# Patient Record
Sex: FEMALE | Race: WHITE | Employment: FULL TIME | URBAN - METROPOLITAN AREA
[De-identification: names, ages, dates, MRNs, and addresses within clinical notes are randomized per-mention and may not be internally consistent; named-entity substitution may affect disease eponyms.]

---

## 2017-01-18 ENCOUNTER — OFFICE VISIT (OUTPATIENT)
Dept: ORTHOPEDIC SURGERY | Age: 52
End: 2017-01-18

## 2017-01-18 VITALS
SYSTOLIC BLOOD PRESSURE: 145 MMHG | HEART RATE: 77 BPM | DIASTOLIC BLOOD PRESSURE: 91 MMHG | WEIGHT: 220.02 LBS | HEIGHT: 65 IN | BODY MASS INDEX: 36.66 KG/M2

## 2017-01-18 DIAGNOSIS — M76.61 ACHILLES TENDINITIS OF RIGHT LOWER EXTREMITY: ICD-10-CM

## 2017-01-18 DIAGNOSIS — M25.571 ACUTE RIGHT ANKLE PAIN: Primary | ICD-10-CM

## 2017-01-18 PROCEDURE — 99213 OFFICE O/P EST LOW 20 MIN: CPT | Performed by: ORTHOPAEDIC SURGERY

## 2017-01-25 ENCOUNTER — OFFICE VISIT (OUTPATIENT)
Dept: ORTHOPEDIC SURGERY | Age: 52
End: 2017-01-25

## 2017-01-25 VITALS
WEIGHT: 240 LBS | BODY MASS INDEX: 39.99 KG/M2 | HEIGHT: 65 IN | HEART RATE: 73 BPM | DIASTOLIC BLOOD PRESSURE: 82 MMHG | SYSTOLIC BLOOD PRESSURE: 126 MMHG

## 2017-01-25 DIAGNOSIS — S86.011A PARTIAL TEAR OF ACHILLES TENDON, RIGHT, INITIAL ENCOUNTER: ICD-10-CM

## 2017-01-25 DIAGNOSIS — M76.61 ACHILLES TENDINITIS OF RIGHT LOWER EXTREMITY: Primary | ICD-10-CM

## 2017-01-25 PROBLEM — S86.019A PARTIAL TEAR OF ACHILLES TENDON: Status: ACTIVE | Noted: 2017-01-25

## 2017-01-25 PROCEDURE — 99213 OFFICE O/P EST LOW 20 MIN: CPT | Performed by: ORTHOPAEDIC SURGERY

## 2017-01-31 ENCOUNTER — OFFICE VISIT (OUTPATIENT)
Dept: ORTHOPEDIC SURGERY | Age: 52
End: 2017-01-31

## 2017-01-31 VITALS
DIASTOLIC BLOOD PRESSURE: 82 MMHG | SYSTOLIC BLOOD PRESSURE: 131 MMHG | HEART RATE: 71 BPM | HEIGHT: 65 IN | BODY MASS INDEX: 40 KG/M2 | WEIGHT: 240.08 LBS

## 2017-01-31 DIAGNOSIS — M76.61 ACHILLES TENDINITIS OF RIGHT LOWER EXTREMITY: Primary | ICD-10-CM

## 2017-01-31 PROCEDURE — 99214 OFFICE O/P EST MOD 30 MIN: CPT | Performed by: ORTHOPAEDIC SURGERY

## 2017-01-31 RX ORDER — HYDROCODONE BITARTRATE AND ACETAMINOPHEN 5; 325 MG/1; MG/1
1 TABLET ORAL EVERY 6 HOURS PRN
Qty: 40 TABLET | Refills: 0 | Status: SHIPPED | OUTPATIENT
Start: 2017-01-31 | End: 2017-02-07

## 2017-01-31 RX ORDER — CLINDAMYCIN HYDROCHLORIDE 300 MG/1
300 CAPSULE ORAL EVERY 8 HOURS
Qty: 6 CAPSULE | Refills: 0 | Status: SHIPPED | OUTPATIENT
Start: 2017-01-31 | End: 2017-02-13

## 2017-02-02 ENCOUNTER — TELEPHONE (OUTPATIENT)
Dept: ORTHOPEDIC SURGERY | Age: 52
End: 2017-02-02

## 2017-02-20 ENCOUNTER — HOSPITAL ENCOUNTER (OUTPATIENT)
Dept: SURGERY | Age: 52
Discharge: HOME | End: 2017-02-20
Attending: ORTHOPAEDIC SURGERY | Admitting: ORTHOPAEDIC SURGERY

## 2017-02-20 VITALS
HEIGHT: 65 IN | BODY MASS INDEX: 37.32 KG/M2 | OXYGEN SATURATION: 97 % | RESPIRATION RATE: 14 BRPM | TEMPERATURE: 97.7 F | SYSTOLIC BLOOD PRESSURE: 133 MMHG | HEART RATE: 67 BPM | DIASTOLIC BLOOD PRESSURE: 69 MMHG | WEIGHT: 224 LBS

## 2017-02-20 LAB
EKG ATRIAL RATE: 59 BPM
EKG DIAGNOSIS: NORMAL
EKG P AXIS: 40 DEGREES
EKG P-R INTERVAL: 162 MS
EKG Q-T INTERVAL: 488 MS
EKG QRS DURATION: 80 MS
EKG QTC CALCULATION (BAZETT): 483 MS
EKG R AXIS: 9 DEGREES
EKG T AXIS: 9 DEGREES
EKG VENTRICULAR RATE: 59 BPM

## 2017-02-20 PROCEDURE — 93010 ELECTROCARDIOGRAM REPORT: CPT | Performed by: INTERNAL MEDICINE

## 2017-02-20 RX ORDER — TRAZODONE HYDROCHLORIDE 50 MG/1
50 TABLET ORAL NIGHTLY
COMMUNITY

## 2017-02-20 RX ORDER — LIDOCAINE HYDROCHLORIDE 10 MG/ML
2 INJECTION, SOLUTION INFILTRATION; PERINEURAL
Status: ACTIVE | OUTPATIENT
Start: 2017-02-20 | End: 2017-02-20

## 2017-02-20 RX ORDER — MEPERIDINE HYDROCHLORIDE 50 MG/ML
12.5 INJECTION INTRAMUSCULAR; INTRAVENOUS; SUBCUTANEOUS EVERY 5 MIN PRN
Status: DISCONTINUED | OUTPATIENT
Start: 2017-02-20 | End: 2017-02-21 | Stop reason: HOSPADM

## 2017-02-20 RX ORDER — OXYCODONE HYDROCHLORIDE AND ACETAMINOPHEN 5; 325 MG/1; MG/1
2 TABLET ORAL PRN
Status: COMPLETED | OUTPATIENT
Start: 2017-02-20 | End: 2017-02-20

## 2017-02-20 RX ORDER — LABETALOL HYDROCHLORIDE 5 MG/ML
5 INJECTION, SOLUTION INTRAVENOUS EVERY 10 MIN PRN
Status: DISCONTINUED | OUTPATIENT
Start: 2017-02-20 | End: 2017-02-21 | Stop reason: HOSPADM

## 2017-02-20 RX ORDER — SODIUM CHLORIDE, SODIUM LACTATE, POTASSIUM CHLORIDE, CALCIUM CHLORIDE 600; 310; 30; 20 MG/100ML; MG/100ML; MG/100ML; MG/100ML
INJECTION, SOLUTION INTRAVENOUS CONTINUOUS
Status: DISCONTINUED | OUTPATIENT
Start: 2017-02-20 | End: 2017-02-21 | Stop reason: HOSPADM

## 2017-02-20 RX ORDER — MIDAZOLAM HYDROCHLORIDE 1 MG/ML
INJECTION INTRAMUSCULAR; INTRAVENOUS
Status: DISPENSED
Start: 2017-02-20 | End: 2017-02-20

## 2017-02-20 RX ORDER — ONDANSETRON 2 MG/ML
4 INJECTION INTRAMUSCULAR; INTRAVENOUS EVERY 10 MIN PRN
Status: DISCONTINUED | OUTPATIENT
Start: 2017-02-20 | End: 2017-02-21 | Stop reason: HOSPADM

## 2017-02-20 RX ORDER — OXYCODONE HYDROCHLORIDE AND ACETAMINOPHEN 5; 325 MG/1; MG/1
1 TABLET ORAL PRN
Status: COMPLETED | OUTPATIENT
Start: 2017-02-20 | End: 2017-02-20

## 2017-02-20 RX ORDER — HYDRALAZINE HYDROCHLORIDE 20 MG/ML
5 INJECTION INTRAMUSCULAR; INTRAVENOUS EVERY 10 MIN PRN
Status: DISCONTINUED | OUTPATIENT
Start: 2017-02-20 | End: 2017-02-21 | Stop reason: HOSPADM

## 2017-02-20 RX ADMIN — Medication 0.5 MG: at 11:55

## 2017-02-20 RX ADMIN — OXYCODONE HYDROCHLORIDE AND ACETAMINOPHEN 1 TABLET: 5; 325 TABLET ORAL at 12:22

## 2017-02-20 RX ADMIN — SODIUM CHLORIDE, SODIUM LACTATE, POTASSIUM CHLORIDE, CALCIUM CHLORIDE: 600; 310; 30; 20 INJECTION, SOLUTION INTRAVENOUS at 09:08

## 2017-02-20 ASSESSMENT — PAIN SCALES - GENERAL
PAINLEVEL_OUTOF10: 10
PAINLEVEL_OUTOF10: 6
PAINLEVEL_OUTOF10: 10

## 2017-02-20 ASSESSMENT — PAIN - FUNCTIONAL ASSESSMENT: PAIN_FUNCTIONAL_ASSESSMENT: 0-10

## 2017-03-01 ENCOUNTER — OFFICE VISIT (OUTPATIENT)
Dept: ORTHOPEDIC SURGERY | Age: 52
End: 2017-03-01

## 2017-03-01 VITALS
DIASTOLIC BLOOD PRESSURE: 87 MMHG | SYSTOLIC BLOOD PRESSURE: 119 MMHG | WEIGHT: 223.99 LBS | HEART RATE: 114 BPM | HEIGHT: 65 IN | BODY MASS INDEX: 37.32 KG/M2

## 2017-03-01 DIAGNOSIS — S86.011D PARTIAL TEAR OF ACHILLES TENDON, RIGHT, SUBSEQUENT ENCOUNTER: Primary | ICD-10-CM

## 2017-03-01 PROCEDURE — 29405 APPL SHORT LEG CAST: CPT | Performed by: ORTHOPAEDIC SURGERY

## 2017-03-01 PROCEDURE — 99024 POSTOP FOLLOW-UP VISIT: CPT | Performed by: ORTHOPAEDIC SURGERY

## 2017-03-15 ENCOUNTER — OFFICE VISIT (OUTPATIENT)
Dept: ORTHOPEDIC SURGERY | Age: 52
End: 2017-03-15

## 2017-03-15 VITALS
BODY MASS INDEX: 37.32 KG/M2 | DIASTOLIC BLOOD PRESSURE: 78 MMHG | HEART RATE: 72 BPM | HEIGHT: 65 IN | WEIGHT: 223.99 LBS | SYSTOLIC BLOOD PRESSURE: 117 MMHG

## 2017-03-15 DIAGNOSIS — M76.61 ACHILLES TENDINITIS OF RIGHT LOWER EXTREMITY: Primary | ICD-10-CM

## 2017-03-15 PROCEDURE — 99024 POSTOP FOLLOW-UP VISIT: CPT | Performed by: ORTHOPAEDIC SURGERY

## 2017-04-05 ENCOUNTER — OFFICE VISIT (OUTPATIENT)
Dept: ORTHOPEDIC SURGERY | Age: 52
End: 2017-04-05

## 2017-04-05 VITALS
HEART RATE: 81 BPM | SYSTOLIC BLOOD PRESSURE: 126 MMHG | WEIGHT: 223.99 LBS | BODY MASS INDEX: 37.32 KG/M2 | HEIGHT: 65 IN | DIASTOLIC BLOOD PRESSURE: 82 MMHG

## 2017-04-05 DIAGNOSIS — M76.61 ACHILLES TENDINITIS OF RIGHT LOWER EXTREMITY: Primary | ICD-10-CM

## 2017-04-05 PROCEDURE — 99024 POSTOP FOLLOW-UP VISIT: CPT | Performed by: ORTHOPAEDIC SURGERY

## 2017-04-07 ENCOUNTER — HOSPITAL ENCOUNTER (OUTPATIENT)
Dept: PHYSICAL THERAPY | Age: 52
Discharge: HOME | End: 2017-04-30
Admitting: ORTHOPAEDIC SURGERY

## 2017-04-18 ENCOUNTER — HOSPITAL ENCOUNTER (OUTPATIENT)
Dept: PHYSICAL THERAPY | Age: 52
Discharge: HOME | End: 2017-04-18
Admitting: ORTHOPAEDIC SURGERY

## 2017-05-03 ENCOUNTER — OFFICE VISIT (OUTPATIENT)
Dept: ORTHOPEDIC SURGERY | Age: 52
End: 2017-05-03

## 2017-05-03 VITALS
DIASTOLIC BLOOD PRESSURE: 88 MMHG | BODY MASS INDEX: 37.32 KG/M2 | SYSTOLIC BLOOD PRESSURE: 136 MMHG | HEART RATE: 92 BPM | WEIGHT: 223.99 LBS | HEIGHT: 65 IN

## 2017-05-03 DIAGNOSIS — M76.61 ACHILLES TENDINITIS OF RIGHT LOWER EXTREMITY: Primary | ICD-10-CM

## 2017-05-03 PROCEDURE — 99024 POSTOP FOLLOW-UP VISIT: CPT | Performed by: ORTHOPAEDIC SURGERY

## 2017-05-15 ENCOUNTER — HOSPITAL ENCOUNTER (OUTPATIENT)
Dept: PHYSICAL THERAPY | Age: 52
Discharge: HOME | End: 2017-05-15
Admitting: ORTHOPAEDIC SURGERY

## 2017-05-30 ENCOUNTER — HOSPITAL ENCOUNTER (OUTPATIENT)
Dept: PHYSICAL THERAPY | Age: 52
Discharge: HOME | End: 2017-05-30
Admitting: ORTHOPAEDIC SURGERY

## 2017-06-14 ENCOUNTER — OFFICE VISIT (OUTPATIENT)
Dept: ORTHOPEDIC SURGERY | Age: 52
End: 2017-06-14

## 2017-06-14 VITALS
WEIGHT: 223.99 LBS | HEART RATE: 65 BPM | HEIGHT: 65 IN | BODY MASS INDEX: 37.32 KG/M2 | DIASTOLIC BLOOD PRESSURE: 76 MMHG | SYSTOLIC BLOOD PRESSURE: 115 MMHG

## 2017-06-14 DIAGNOSIS — M76.61 ACHILLES TENDINITIS OF RIGHT LOWER EXTREMITY: Primary | ICD-10-CM

## 2017-06-14 PROCEDURE — 99212 OFFICE O/P EST SF 10 MIN: CPT | Performed by: ORTHOPAEDIC SURGERY

## 2017-07-31 ENCOUNTER — OFFICE VISIT (OUTPATIENT)
Dept: ORTHOPEDIC SURGERY | Age: 52
End: 2017-07-31

## 2017-07-31 VITALS
WEIGHT: 223.99 LBS | HEART RATE: 67 BPM | BODY MASS INDEX: 37.32 KG/M2 | SYSTOLIC BLOOD PRESSURE: 121 MMHG | DIASTOLIC BLOOD PRESSURE: 82 MMHG | HEIGHT: 65 IN

## 2017-07-31 DIAGNOSIS — M50.30 DDD (DEGENERATIVE DISC DISEASE), CERVICAL: ICD-10-CM

## 2017-07-31 DIAGNOSIS — M54.12 CERVICAL RADICULITIS: ICD-10-CM

## 2017-07-31 DIAGNOSIS — M54.2 NECK PAIN: Primary | ICD-10-CM

## 2017-07-31 PROCEDURE — 99214 OFFICE O/P EST MOD 30 MIN: CPT | Performed by: PHYSICIAN ASSISTANT

## 2017-07-31 PROCEDURE — 72050 X-RAY EXAM NECK SPINE 4/5VWS: CPT | Performed by: PHYSICIAN ASSISTANT

## 2017-07-31 RX ORDER — GABAPENTIN 300 MG/1
CAPSULE ORAL
Qty: 30 CAPSULE | Refills: 0 | Status: SHIPPED | OUTPATIENT
Start: 2017-07-31

## 2017-07-31 RX ORDER — PREDNISONE 10 MG/1
TABLET ORAL
Qty: 26 TABLET | Refills: 0 | Status: SHIPPED | OUTPATIENT
Start: 2017-07-31 | End: 2019-07-29

## 2017-11-22 ENCOUNTER — HOSPITAL ENCOUNTER (OUTPATIENT)
Dept: OTHER | Age: 52
Discharge: HOME | End: 2017-11-22
Attending: NURSE PRACTITIONER | Admitting: NURSE PRACTITIONER

## 2017-11-22 LAB
EKG ATRIAL RATE: 72 BPM
EKG DIAGNOSIS: NORMAL
EKG P AXIS: 40 DEGREES
EKG P-R INTERVAL: 164 MS
EKG Q-T INTERVAL: 432 MS
EKG QRS DURATION: 76 MS
EKG QTC CALCULATION (BAZETT): 473 MS
EKG R AXIS: 18 DEGREES
EKG T AXIS: 13 DEGREES
EKG VENTRICULAR RATE: 72 BPM

## 2017-11-22 PROCEDURE — 93010 ELECTROCARDIOGRAM REPORT: CPT | Performed by: INTERNAL MEDICINE

## 2018-11-12 ENCOUNTER — OFFICE VISIT (OUTPATIENT)
Dept: ORTHOPEDIC SURGERY | Age: 53
End: 2018-11-12

## 2018-11-12 VITALS — HEIGHT: 65 IN | BODY MASS INDEX: 37.49 KG/M2 | RESPIRATION RATE: 16 BRPM | WEIGHT: 225 LBS

## 2018-11-12 DIAGNOSIS — M67.441 GANGLION CYST OF FLEXOR TENDON SHEATH OF FINGER OF RIGHT HAND: ICD-10-CM

## 2018-11-12 DIAGNOSIS — M79.644 FINGER PAIN, RIGHT: Primary | ICD-10-CM

## 2018-11-12 DIAGNOSIS — R22.31 FINGER MASS, RIGHT: ICD-10-CM

## 2018-11-12 PROCEDURE — 20550 NJX 1 TENDON SHEATH/LIGAMENT: CPT | Performed by: ORTHOPAEDIC SURGERY

## 2018-11-12 PROCEDURE — 99202 OFFICE O/P NEW SF 15 MIN: CPT | Performed by: ORTHOPAEDIC SURGERY

## 2018-11-12 NOTE — PROGRESS NOTES
were made to ensure that this office note is accurate.     Orders Placed This Encounter   Procedures    XR FINGER RIGHT (MIN 2 VIEWS)    TX INJECT TENDON SHEATH/LIGAMENT    TX BETAMETHASONE ACET&SOD PHOSP

## 2019-01-07 ENCOUNTER — OFFICE VISIT (OUTPATIENT)
Dept: ORTHOPEDIC SURGERY | Age: 54
End: 2019-01-07
Payer: COMMERCIAL

## 2019-01-07 VITALS
WEIGHT: 225.09 LBS | HEIGHT: 65 IN | HEART RATE: 84 BPM | SYSTOLIC BLOOD PRESSURE: 140 MMHG | DIASTOLIC BLOOD PRESSURE: 89 MMHG | BODY MASS INDEX: 37.5 KG/M2

## 2019-01-07 DIAGNOSIS — S86.112A GASTROCNEMIUS MUSCLE RUPTURE, LEFT, INITIAL ENCOUNTER: Primary | ICD-10-CM

## 2019-01-07 PROCEDURE — 99203 OFFICE O/P NEW LOW 30 MIN: CPT | Performed by: ORTHOPAEDIC SURGERY

## 2019-01-24 ENCOUNTER — OFFICE VISIT (OUTPATIENT)
Dept: ORTHOPEDIC SURGERY | Age: 54
End: 2019-01-24
Payer: COMMERCIAL

## 2019-01-24 VITALS — WEIGHT: 225.09 LBS | HEIGHT: 65 IN | BODY MASS INDEX: 37.5 KG/M2

## 2019-01-24 DIAGNOSIS — M76.62 ACHILLES TENDINITIS OF LEFT LOWER EXTREMITY: Primary | ICD-10-CM

## 2019-01-24 PROCEDURE — 99213 OFFICE O/P EST LOW 20 MIN: CPT | Performed by: ORTHOPAEDIC SURGERY

## 2019-01-24 RX ORDER — METHYLPREDNISOLONE 4 MG/1
TABLET ORAL
Qty: 1 KIT | Refills: 0 | Status: SHIPPED | OUTPATIENT
Start: 2019-01-24 | End: 2019-07-29

## 2019-02-04 ENCOUNTER — OFFICE VISIT (OUTPATIENT)
Dept: ORTHOPEDIC SURGERY | Age: 54
End: 2019-02-04
Payer: COMMERCIAL

## 2019-02-04 VITALS
WEIGHT: 225.09 LBS | SYSTOLIC BLOOD PRESSURE: 114 MMHG | HEART RATE: 82 BPM | BODY MASS INDEX: 37.5 KG/M2 | DIASTOLIC BLOOD PRESSURE: 69 MMHG | HEIGHT: 65 IN

## 2019-02-04 DIAGNOSIS — M76.62 ACHILLES BURSITIS OF LEFT LOWER EXTREMITY: Primary | ICD-10-CM

## 2019-02-04 PROCEDURE — 99213 OFFICE O/P EST LOW 20 MIN: CPT | Performed by: ORTHOPAEDIC SURGERY

## 2019-07-29 ENCOUNTER — HOSPITAL ENCOUNTER (EMERGENCY)
Age: 54
Discharge: HOME | End: 2019-07-29
Payer: COMMERCIAL

## 2019-07-29 VITALS
RESPIRATION RATE: 16 BRPM | TEMPERATURE: 98.2 F | HEART RATE: 82 BPM | HEIGHT: 65 IN | BODY MASS INDEX: 37.65 KG/M2 | OXYGEN SATURATION: 96 % | DIASTOLIC BLOOD PRESSURE: 77 MMHG | WEIGHT: 226 LBS | SYSTOLIC BLOOD PRESSURE: 130 MMHG

## 2019-07-29 DIAGNOSIS — M79.605 LEFT LEG PAIN: Primary | ICD-10-CM

## 2019-07-29 PROCEDURE — 6370000000 HC RX 637 (ALT 250 FOR IP): Performed by: PHYSICIAN ASSISTANT

## 2019-07-29 PROCEDURE — 99282 EMERGENCY DEPT VISIT SF MDM: CPT

## 2019-07-29 RX ADMIN — APIXABAN 10 MG: 5 TABLET, FILM COATED ORAL at 17:52

## 2019-07-29 ASSESSMENT — PAIN DESCRIPTION - PROGRESSION: CLINICAL_PROGRESSION: NOT CHANGED

## 2019-07-29 ASSESSMENT — PAIN DESCRIPTION - LOCATION: LOCATION: LEG

## 2019-07-29 ASSESSMENT — PAIN DESCRIPTION - DESCRIPTORS: DESCRIPTORS: ACHING;CONSTANT

## 2019-07-29 ASSESSMENT — PAIN DESCRIPTION - PAIN TYPE: TYPE: ACUTE PAIN

## 2019-07-29 ASSESSMENT — PAIN SCALES - GENERAL: PAINLEVEL_OUTOF10: 8

## 2019-07-29 ASSESSMENT — PAIN DESCRIPTION - ONSET: ONSET: ON-GOING

## 2019-07-29 ASSESSMENT — PAIN DESCRIPTION - FREQUENCY: FREQUENCY: CONTINUOUS

## 2019-07-29 ASSESSMENT — PAIN DESCRIPTION - ORIENTATION: ORIENTATION: LEFT

## 2019-07-30 ENCOUNTER — HOSPITAL ENCOUNTER (OUTPATIENT)
Dept: VASCULAR LAB | Age: 54
Discharge: HOME | End: 2019-07-30
Payer: COMMERCIAL

## 2019-07-30 DIAGNOSIS — M79.605 LEFT LEG PAIN: ICD-10-CM

## 2019-07-30 PROCEDURE — 93971 EXTREMITY STUDY: CPT

## 2020-10-05 ENCOUNTER — HOSPITAL ENCOUNTER (EMERGENCY)
Age: 55
Discharge: HOME | End: 2020-10-05
Attending: EMERGENCY MEDICINE
Payer: COMMERCIAL

## 2020-10-05 ENCOUNTER — HOSPITAL ENCOUNTER (OUTPATIENT)
Dept: VASCULAR LAB | Age: 55
Discharge: HOME | End: 2020-10-05
Payer: COMMERCIAL

## 2020-10-05 VITALS
TEMPERATURE: 98.3 F | OXYGEN SATURATION: 100 % | SYSTOLIC BLOOD PRESSURE: 159 MMHG | HEART RATE: 79 BPM | DIASTOLIC BLOOD PRESSURE: 94 MMHG | RESPIRATION RATE: 21 BRPM

## 2020-10-05 LAB
ANION GAP SERPL CALCULATED.3IONS-SCNC: 7 MMOL/L (ref 3–16)
APTT: 30.8 SEC (ref 24.2–36.2)
BASOPHILS ABSOLUTE: 0.1 K/UL (ref 0–0.2)
BASOPHILS RELATIVE PERCENT: 0.6 %
BUN BLDV-MCNC: 8 MG/DL (ref 7–20)
CALCIUM SERPL-MCNC: 9.2 MG/DL (ref 8.3–10.6)
CHLORIDE BLD-SCNC: 101 MMOL/L (ref 99–110)
CO2: 29 MMOL/L (ref 21–32)
CREAT SERPL-MCNC: 0.8 MG/DL (ref 0.6–1.1)
EOSINOPHILS ABSOLUTE: 0 K/UL (ref 0–0.6)
EOSINOPHILS RELATIVE PERCENT: 0 %
GFR AFRICAN AMERICAN: >60
GFR NON-AFRICAN AMERICAN: >60
GLUCOSE BLD-MCNC: 99 MG/DL (ref 70–99)
HCT VFR BLD CALC: 40.1 % (ref 36–48)
HEMOGLOBIN: 13.5 G/DL (ref 12–16)
INR BLD: 1.05 (ref 0.86–1.14)
LYMPHOCYTES ABSOLUTE: 2.4 K/UL (ref 1–5.1)
LYMPHOCYTES RELATIVE PERCENT: 28.7 %
MCH RBC QN AUTO: 30 PG (ref 26–34)
MCHC RBC AUTO-ENTMCNC: 33.7 G/DL (ref 31–36)
MCV RBC AUTO: 88.9 FL (ref 80–100)
MONOCYTES ABSOLUTE: 0.7 K/UL (ref 0–1.3)
MONOCYTES RELATIVE PERCENT: 8.2 %
NEUTROPHILS ABSOLUTE: 5.3 K/UL (ref 1.7–7.7)
NEUTROPHILS RELATIVE PERCENT: 62.5 %
PDW BLD-RTO: 14.3 % (ref 12.4–15.4)
PLATELET # BLD: 229 K/UL (ref 135–450)
PMV BLD AUTO: 8 FL (ref 5–10.5)
POTASSIUM REFLEX MAGNESIUM: 3.9 MMOL/L (ref 3.5–5.1)
PROTHROMBIN TIME: 12.2 SEC (ref 10–13.2)
RBC # BLD: 4.51 M/UL (ref 4–5.2)
SODIUM BLD-SCNC: 137 MMOL/L (ref 136–145)
WBC # BLD: 8.5 K/UL (ref 4–11)

## 2020-10-05 PROCEDURE — 93971 EXTREMITY STUDY: CPT

## 2020-10-05 PROCEDURE — 99283 EMERGENCY DEPT VISIT LOW MDM: CPT

## 2020-10-05 PROCEDURE — 85730 THROMBOPLASTIN TIME PARTIAL: CPT

## 2020-10-05 PROCEDURE — 85025 COMPLETE CBC W/AUTO DIFF WBC: CPT

## 2020-10-05 PROCEDURE — 85610 PROTHROMBIN TIME: CPT

## 2020-10-05 PROCEDURE — 6370000000 HC RX 637 (ALT 250 FOR IP): Performed by: EMERGENCY MEDICINE

## 2020-10-05 PROCEDURE — 80048 BASIC METABOLIC PNL TOTAL CA: CPT

## 2020-10-05 RX ADMIN — APIXABAN 80 MG: 5 TABLET, FILM COATED ORAL at 06:56

## 2020-10-05 RX ADMIN — APIXABAN 10 MG: 5 TABLET, FILM COATED ORAL at 06:56

## 2020-10-05 ASSESSMENT — PAIN DESCRIPTION - ORIENTATION: ORIENTATION: LEFT

## 2020-10-05 ASSESSMENT — PAIN DESCRIPTION - PAIN TYPE: TYPE: ACUTE PAIN

## 2020-10-05 ASSESSMENT — PAIN DESCRIPTION - LOCATION: LOCATION: LEG

## 2020-10-05 ASSESSMENT — PAIN DESCRIPTION - DESCRIPTORS: DESCRIPTORS: ACHING

## 2020-10-05 ASSESSMENT — PAIN SCALES - GENERAL: PAINLEVEL_OUTOF10: 4

## 2020-10-05 ASSESSMENT — PAIN DESCRIPTION - FREQUENCY: FREQUENCY: CONTINUOUS

## 2020-10-05 NOTE — ED PROVIDER NOTES
MG TABS TABLET    Take 10 mg (2 tablets) orally twice daily for 7 days, then take 5 mg (1 tablet) orally twice daily thereafter. BIOTIN 5000 PO    Take 1 tablet by mouth daily. GABAPENTIN (NEURONTIN) 300 MG CAPSULE    i po qHS    METOPROLOL (LOPRESSOR) 100 MG TABLET    Take 50 mg by mouth nightly     MULTIPLE VITAMINS-CALCIUM (ONE-A-DAY WOMENS PO)    Take 1 tablet by mouth daily. ROSUVASTATIN (CRESTOR) 40 MG TABLET    Take 40 mg by mouth every evening.       SERTRALINE (ZOLOFT) 100 MG TABLET    Take 200 mg by mouth daily     TRAZODONE (DESYREL) 50 MG TABLET    Take 50 mg by mouth nightly 1/2 as needed    VITAMIN D (CHOLECALCIFEROL) 1000 UNIT TABS TABLET    Take 2,000 Units by mouth daily        ALLERGIES     Amoxicillin; Codeine; Naproxen; Pravastatin; and Vancomycin    FAMILY HISTORY       Family History   Problem Relation Age of Onset    Heart Disease Father         CHF    Diabetes Father           SOCIAL HISTORY       Social History     Socioeconomic History    Marital status:      Spouse name: None    Number of children: None    Years of education: None    Highest education level: None   Occupational History    None   Social Needs    Financial resource strain: None    Food insecurity     Worry: None     Inability: None    Transportation needs     Medical: None     Non-medical: None   Tobacco Use    Smoking status: Never Smoker    Smokeless tobacco: Never Used   Substance and Sexual Activity    Alcohol use: No    Drug use: No    Sexual activity: Yes     Partners: Male   Lifestyle    Physical activity     Days per week: None     Minutes per session: None    Stress: None   Relationships    Social connections     Talks on phone: None     Gets together: None     Attends Scientologist service: None     Active member of club or organization: None     Attends meetings of clubs or organizations: None     Relationship status: None    Intimate partner violence     Fear of current or ex partner: None     Emotionally abused: None     Physically abused: None     Forced sexual activity: None   Other Topics Concern    None   Social History Narrative    None       SCREENINGS    Jorje Coma Scale  Eye Opening: Spontaneous  Best Verbal Response: Oriented  Best Motor Response: Obeys commands  Jorje Coma Scale Score: 15          PHYSICAL EXAM    (up to 7 for level 4, 8 or more for level 5)     ED Triage Vitals   BP Temp Temp Source Pulse Resp SpO2 Height Weight   10/05/20 0544 10/05/20 0546 10/05/20 0546 10/05/20 0544 10/05/20 0544 10/05/20 0544 -- --   (!) 159/94 98.3 °F (36.8 °C) Oral 79 21 100 %         Physical Exam    General appearance:  Cooperative. No acute distress. Skin:  Warm. Dry. Eye:  Extraocular movements intact. Ears, nose, mouth and throat:  Oral mucosa moist,  Neck:  Trachea midline. Heart:  Regular rate and rhythm  Perfusion:  intact  Respiratory:  Lungs clear to auscultation bilaterally. Respirations nonlabored. Abdominal:   Non distended. Nontender  Neurological:  Alert and oriented x 3. Moves all extremities spontaneously  Musculoskeletal:   Normal ROM, no deformities. Mild erythema along the left medial thigh with a palpable cord overlying the saphenous vein that is slightly tender to palpation          Psychiatric:  Normal mood      DIAGNOSTIC RESULTS       Labs Reviewed   CBC WITH AUTO DIFFERENTIAL   BASIC METABOLIC PANEL W/ REFLEX TO MG FOR LOW K   PROTIME-INR   APTT       Interpretation per the Radiologist below, if obtained/available at the time of this note:    VL Extremity Venous Left    (Results Pending)       All other labs/imaging were within normal range or not returned as of this dictation.     EMERGENCY DEPARTMENT COURSE and DIFFERENTIAL DIAGNOSIS/MDM:   Vitals:    Vitals:    10/05/20 0544 10/05/20 0546   BP: (!) 159/94    Pulse: 79    Resp: 21    Temp:  98.3 °F (36.8 °C)   TempSrc:  Oral   SpO2: 100%        Patient presents emergency department

## 2023-01-20 ENCOUNTER — OFFICE VISIT (OUTPATIENT)
Dept: ORTHOPEDIC SURGERY | Age: 58
End: 2023-01-20

## 2023-01-20 VITALS — WEIGHT: 226 LBS | HEIGHT: 65 IN | BODY MASS INDEX: 37.65 KG/M2

## 2023-01-20 DIAGNOSIS — M25.561 RIGHT KNEE PAIN, UNSPECIFIED CHRONICITY: ICD-10-CM

## 2023-01-20 DIAGNOSIS — M17.11 PATELLOFEMORAL ARTHRITIS OF RIGHT KNEE: Primary | ICD-10-CM

## 2023-01-20 NOTE — PROGRESS NOTES
ORTHOPAEDIC SURGERY INITIAL EVALUATION NOTE  Chief Complaint   Patient presents with    New Patient     Right knee pain       HISTORY OF PRESENT ILLNESS:  62-year-old female presents for evaluation of chronic right knee pain. It has been ongoing for at least a year. She indicates that she has had a prior arthroscopy approximately 20 years ago which she indicates was for arthritis. She denies any meniscal pathology at that time. She states that she has had Rastafarian of giving way episodes since Christmas. Gives out 1-2 times per week. She indicates that her pain is up to an 8 out of 10. It is alleviated with Advil. She denies sensations of instability other than the giving out episodes. She has the occasional clicking sensation. Her pain is localizing to the anterior knee and does not radiate. Past Medical History:   Diagnosis Date    Arthritis     Depression     DVT (deep venous thrombosis) ([facility])     left leg    Herniated cervical disc     Hypercholesteremia     Hypertension     PONV (postoperative nausea and vomiting)        Current Outpatient Medications   Medication Sig Dispense Refill    apixaban (ELIQUIS STARTER PACK) 5 MG TABS tablet Take 10 mg (2 tablets) orally twice daily for 7 days, then take 5 mg (1 tablet) orally twice daily thereafter. 74 tablet 0    gabapentin (NEURONTIN) 300 MG capsule i po qHS 30 capsule 0    traZODone (DESYREL) 50 MG tablet Take 50 mg by mouth nightly 1/2 as needed      Multiple Vitamins-Calcium (ONE-A-DAY WOMENS PO) Take 1 tablet by mouth daily. BIOTIN 5000 PO Take 1 tablet by mouth daily. vitamin D (CHOLECALCIFEROL) 1000 UNIT TABS tablet Take 2,000 Units by mouth daily       sertraline (ZOLOFT) 100 MG tablet Take 200 mg by mouth daily       metoprolol (LOPRESSOR) 100 MG tablet Take 50 mg by mouth nightly       rosuvastatin (CRESTOR) 40 MG tablet Take 40 mg by mouth every evening. No current facility-administered medications for this visit.         Past Surgical History:   Procedure Laterality Date    ANKLE SURGERY Left 1/7/14    OPEN EXCISION OF CALCANEOUS SPUR OF ACHILLES TENDON REPAIR OF LEFT ANKLE    KNEE SURGERY Right 1/7/14    SHOULDER ARTHROSCOPY Right        Allergies   Allergen Reactions    Amoxicillin     Codeine Itching    Naproxen Other (See Comments)    Pravastatin Other (See Comments)     headache    Vancomycin        Family History   Problem Relation Age of Onset    Heart Disease Father         CHF    Diabetes Father        Social History     Socioeconomic History    Marital status:      Spouse name: Not on file    Number of children: Not on file    Years of education: Not on file    Highest education level: Not on file   Occupational History    Not on file   Tobacco Use    Smoking status: Never    Smokeless tobacco: Never   Substance and Sexual Activity    Alcohol use: No    Drug use: No    Sexual activity: Yes     Partners: Male   Other Topics Concern    Not on file   Social History Narrative    Not on file     Social Determinants of Health     Financial Resource Strain: Not on file   Food Insecurity: Not on file   Transportation Needs: Not on file   Physical Activity: Not on file   Stress: Not on file   Social Connections: Not on file   Intimate Partner Violence: Not on file   Housing Stability: Not on file     Review of Systems: Please see today's intake form for complete review of systems. PHYSICAL EXAM  Gen: awake, alert, oriented  HEENT: atraumatic, normocephalic  Neck: supple  Resp: equal chest rise bilaterally, no audible wheezes, no accessory muscle use. CV: Lower extremities warm and well perfused. Abd: soft, nontender   Focused examination of the   right knee:  No cuts, open wounds, or abrasions to the right knee. No obvious effusion. Knee range of motion is full extension to 115 degrees of flexion without difficulty. There is negative anterior posterior drawer test.  Negative Lachman.   There is no appreciable instability to varus or valgus stress at 0 or 30 degrees of flexion. There is minor medial joint line tenderness to palpation. No reproducible mechanical symptoms with Ky exam.  There is pain with patellar ballottement. There is no significant patellofemoral crepitus during range of motion. Patellar tracking is appropriate. Sensation is intact to light touch in deep peroneal, superficial peroneal, tibial, sural, and saphenous nerve distributions. Motor function is intact to EHL, FHL, tibialis anterior, and gastroc. There is brisk capillary refill to the toes and a strong palpable dorsalis pedis pulse. Compartments are soft and compressible. There is no calf tenderness and a negative Homans' sign. RADIOGRAPHIC EXAM:  4 views of the right knee including weightbearing AP, tunnel, lateral, and sunrise x-rays demonstrate subtle medial joint line narrowing. Lateral compartment relatively well-preserved. There is spurring deep to the patella seen best on lateral view. Patellar tracking is appropriate on sunrise x-ray. No fracture or dislocation. ASSESSEMENT AND PLAN    61 y.o. female with the following orthopaedic surgery problems:    Right knee patellofemoral osteoarthritis    We discussed conservative management including oral anti-inflammatories, activity modification, temperature modalities, Voltaren gel, intra-articular injections and a course of therapy. At this point, the patient elects to proceed with a treatment course that involves oral anti-inflammatories, activity modification and a referral to physical therapy. This would be primarily for VMO strengthening. I offered her an intra-articular corticosteroid injection. She declines this today and instead will proceed with the therapy route. She will return in 6 weeks for repeat assessment and consider an injection at that time.     Crystal Stuart MD

## 2023-05-11 ENCOUNTER — TRANSCRIBE ORDERS (OUTPATIENT)
Dept: ADMINISTRATIVE | Age: 58
End: 2023-05-11

## 2023-05-11 DIAGNOSIS — M79.605 LEFT LEG PAIN: Primary | ICD-10-CM

## 2023-05-31 ENCOUNTER — HOSPITAL ENCOUNTER (OUTPATIENT)
Dept: VASCULAR LAB | Age: 58
Discharge: HOME | End: 2023-05-31
Payer: COMMERCIAL

## 2023-05-31 DIAGNOSIS — M79.605 LEFT LEG PAIN: ICD-10-CM

## 2023-05-31 PROCEDURE — 93971 EXTREMITY STUDY: CPT

## 2025-04-24 ENCOUNTER — HOSPITAL ENCOUNTER (OUTPATIENT)
Dept: WOMENS IMAGING | Age: 60
Discharge: HOME | End: 2025-04-24
Payer: COMMERCIAL

## 2025-04-24 VITALS — BODY MASS INDEX: 34.99 KG/M2 | WEIGHT: 210 LBS | HEIGHT: 65 IN

## 2025-04-24 DIAGNOSIS — Z12.31 SCREENING MAMMOGRAM FOR BREAST CANCER: ICD-10-CM

## 2025-04-24 PROCEDURE — 77063 BREAST TOMOSYNTHESIS BI: CPT

## 2025-04-28 ENCOUNTER — TELEPHONE (OUTPATIENT)
Dept: WOMENS IMAGING | Age: 60
End: 2025-04-28

## 2025-04-28 NOTE — TELEPHONE ENCOUNTER
Left message to review results of screening mammogram and schedule follow up.     Edilma Knight RN

## 2025-05-13 ENCOUNTER — HOSPITAL ENCOUNTER (OUTPATIENT)
Dept: WOMENS IMAGING | Age: 60
Discharge: HOME | End: 2025-05-13
Payer: COMMERCIAL

## 2025-05-13 ENCOUNTER — HOSPITAL ENCOUNTER (OUTPATIENT)
Dept: WOMENS IMAGING | Age: 60
End: 2025-05-13
Payer: COMMERCIAL

## 2025-05-13 DIAGNOSIS — R92.8 ABNORMAL MAMMOGRAM: ICD-10-CM

## 2025-05-13 PROCEDURE — 76642 ULTRASOUND BREAST LIMITED: CPT
